# Patient Record
Sex: FEMALE | Race: WHITE | Employment: FULL TIME | URBAN - METROPOLITAN AREA
[De-identification: names, ages, dates, MRNs, and addresses within clinical notes are randomized per-mention and may not be internally consistent; named-entity substitution may affect disease eponyms.]

---

## 2017-12-04 ENCOUNTER — APPOINTMENT (OUTPATIENT)
Dept: GENERAL RADIOLOGY | Facility: HOSPITAL | Age: 42
End: 2017-12-04
Payer: MEDICAID

## 2017-12-04 ENCOUNTER — HOSPITAL ENCOUNTER (EMERGENCY)
Facility: HOSPITAL | Age: 42
Discharge: HOME | End: 2017-12-04
Payer: MEDICAID

## 2017-12-04 VITALS
SYSTOLIC BLOOD PRESSURE: 148 MMHG | HEART RATE: 80 BPM | RESPIRATION RATE: 16 BRPM | BODY MASS INDEX: 36.32 KG/M2 | OXYGEN SATURATION: 99 % | WEIGHT: 205 LBS | TEMPERATURE: 98 F | DIASTOLIC BLOOD PRESSURE: 71 MMHG | HEIGHT: 63 IN

## 2017-12-04 DIAGNOSIS — S80.01XA CONTUSION OF RIGHT KNEE, INITIAL ENCOUNTER: Primary | ICD-10-CM

## 2017-12-04 PROCEDURE — 73560 X-RAY EXAM OF KNEE 1 OR 2: CPT

## 2017-12-04 PROCEDURE — 99283 EMERGENCY DEPT VISIT LOW MDM: CPT

## 2017-12-04 RX ORDER — ACETAMINOPHEN AND CODEINE PHOSPHATE 300; 30 MG/1; MG/1
1 TABLET ORAL EVERY 4 HOURS PRN
Qty: 20 TABLET | Refills: 0 | Status: SHIPPED | OUTPATIENT
Start: 2017-12-04 | End: 2017-12-11

## 2017-12-05 NOTE — ED NOTES
Pt safe to d/c home per MD, able to dress self.  D/C teaching completed, pt verbalizes understanding, ambulatory with steady gait to exit      Knee immobilizer applied

## 2017-12-05 NOTE — ED INITIAL ASSESSMENT (HPI)
PT came in for right knee pain after fall onto knee. Pt had mechanical fall while walking. Denies hitting head or LOC. No blood thinners. RR even and nonlabored, speaking in full sentences.

## 2017-12-05 NOTE — ED PROVIDER NOTES
Patient Seen in: [facility] AND [facility] Emergency Department    History   Patient presents with:  Lower Extremity Injury (musculoskeletal)    Stated Complaint: fall onto right knee    HPI    Patient presents the emergency department after tripping and falling No data to display    ED Course as of Dec 04 2332  ------------------------------------------------------------       [facility]       Radiology findings: Xr Knee (1 Or 2 Views), Right (cpt=73560)    Result Date: 12/4/2017  CONCLUSION:   No acute fracture.

## 2018-12-13 ENCOUNTER — HOSPITAL ENCOUNTER (EMERGENCY)
Facility: HOSPITAL | Age: 43
Discharge: HOME | End: 2018-12-13
Attending: EMERGENCY MEDICINE
Payer: MEDICAID

## 2018-12-13 VITALS
BODY MASS INDEX: 34.91 KG/M2 | DIASTOLIC BLOOD PRESSURE: 82 MMHG | OXYGEN SATURATION: 97 % | HEART RATE: 90 BPM | TEMPERATURE: 98 F | HEIGHT: 63 IN | WEIGHT: 197 LBS | RESPIRATION RATE: 18 BRPM | SYSTOLIC BLOOD PRESSURE: 132 MMHG

## 2018-12-13 DIAGNOSIS — H10.13 ALLERGIC CONJUNCTIVITIS OF BOTH EYES: Primary | ICD-10-CM

## 2018-12-13 PROCEDURE — 99282 EMERGENCY DEPT VISIT SF MDM: CPT

## 2018-12-13 RX ORDER — KETOTIFEN FUMARATE 0.35 MG/ML
1 SOLUTION/ DROPS OPHTHALMIC 2 TIMES DAILY
Qty: 10 ML | Refills: 0 | Status: SHIPPED | OUTPATIENT
Start: 2018-12-13

## 2018-12-13 RX ORDER — HYDROCHLOROTHIAZIDE 12.5 MG/1
12.5 CAPSULE, GELATIN COATED ORAL DAILY
COMMUNITY

## 2018-12-13 RX ORDER — LOSARTAN POTASSIUM 50 MG/1
50 TABLET ORAL DAILY
COMMUNITY

## 2018-12-13 NOTE — ED PROVIDER NOTES
Patient Seen in: [facility] AND [facility] Emergency Department    History   Patient presents with:   Eye Visual Problem (opthalmic)    Stated Complaint: bilateral eye redness    HPI    64-year-old female with past medical history significant for high blood press m/r/g, normal distal pulses  Pulmonary/Chest: CTA b/l with no rales, wheezes. No chest wall tenderness  Abdominal: Nontender. Nondistended. Soft. Bowel sounds are normal.   Back:   : Musculoskeletal: Normal range of motion. No deformity.    Arthur Fina

## 2024-12-13 ENCOUNTER — HOSPITAL ENCOUNTER (EMERGENCY)
Facility: HOSPITAL | Age: 49
Discharge: HOME | End: 2024-12-13
Attending: EMERGENCY MEDICINE
Payer: OTHER MISCELLANEOUS

## 2024-12-13 ENCOUNTER — APPOINTMENT (OUTPATIENT)
Dept: GENERAL RADIOLOGY | Facility: HOSPITAL | Age: 49
End: 2024-12-13
Attending: EMERGENCY MEDICINE
Payer: OTHER MISCELLANEOUS

## 2024-12-13 VITALS
DIASTOLIC BLOOD PRESSURE: 60 MMHG | SYSTOLIC BLOOD PRESSURE: 127 MMHG | OXYGEN SATURATION: 97 % | RESPIRATION RATE: 18 BRPM | HEART RATE: 71 BPM | TEMPERATURE: 98 F

## 2024-12-13 DIAGNOSIS — S90.121A CONTUSION OF LESSER TOE OF RIGHT FOOT WITHOUT DAMAGE TO NAIL, INITIAL ENCOUNTER: Primary | ICD-10-CM

## 2024-12-13 PROCEDURE — 73630 X-RAY EXAM OF FOOT: CPT | Performed by: EMERGENCY MEDICINE

## 2024-12-13 PROCEDURE — 99283 EMERGENCY DEPT VISIT LOW MDM: CPT

## 2024-12-14 NOTE — ED PROVIDER NOTES
Patient Seen in: [facility] Emergency Department      History   No chief complaint on file.    Stated Complaint: Foot Injury    Subjective:     49-year-old female states she dropped a heavy toolbox on her right foot 2 to 3 hours ago and she has mostly pain in the pinky toe when she ambulates.  No wounds or bleeding.  She was wearing shoes while this occurred.  She said just sitting there does not really hurt.  No ankle pain.  No other complaints. This did happen at [employer] while she was working.              Objective:     No pertinent past medical history.            No pertinent past surgical history.              No pertinent social history.                Physical Exam     ED Triage Vitals [12/13/24 8755]   /80   Pulse 65   Resp 20   Temp 97.6 °F (36.4 °C)   Temp src    SpO2 99 %   O2 Device None (Room air)       Current Vitals:   Vital Signs  BP: 122/80  Pulse: 65  Resp: 20  Temp: 97.6 °F (36.4 °C)    Oxygen Therapy  SpO2: 99 %  O2 Device: None (Room air)        Physical Exam  HENT:      Head: Normocephalic.      Right Ear: External ear normal.      Left Ear: External ear normal.      Nose: Nose normal.      Mouth/Throat:      Mouth: Mucous membranes are moist.   Eyes:      Extraocular Movements: Extraocular movements intact.   Neck:      Comments: Moves head around easily  Pulmonary:      Effort: Pulmonary effort is normal.   Musculoskeletal:      Comments: Right foot carefully examined: The skin is intact.  Tenderness only to the pinky toe.  No tenderness throughout the foot.  Ankle is nontender on the right as well.  Strong pedal pulse.  Normal distal sensation station.  Right knee unremarkable   Skin:     General: Skin is warm.   Neurological:      Mental Status: She is alert.      Sensory: No sensory deficit.      Motor: No weakness.             ED Course   Labs Reviewed - No data to display    ED Course as of 12/13/24  2104  ------------------------------------------------------------  Time: 12/13 2020  Comment: I do spoke to radiology.  They are coming to do the film.              [facility]      XR FOOT, COMPLETE (MIN 3 VIEWS), RIGHT (CPT=73630)    Result Date: 12/13/2024  CONCLUSION:  No radiographically visible acute osseous injury of the right foot.    Dictated by (CST): Christiano Craft MD on 12/13/2024 at 9:01 PM     Finalized by (CST): Christiano Craft MD on 12/13/2024 at 9:02 PM                 Medical Decision Making  Patient with work-related injury where she dropped something on her right pinky toe.  Differential could include but not limited to fractures, dislocations, contusions, lacerations and many other possibilities.  No wound involvement on exam.  Will get x-rays.  Will refer to [facility].    Amount and/or Complexity of Data Reviewed  Radiology: ordered and independent interpretation performed.     Details: X-ray of the foot shows no acute process.    Risk  OTC drugs.        Disposition and Plan     Clinical Impression:  1. Contusion of lesser toe of right foot without damage to nail, initial encounter         Disposition:  Discharge  12/13/2024  9:04 pm    Follow-up:  [facility]  [address] [address]  [phone]  Call in 3 days            Medications Prescribed:  Current Discharge Medication List              Supplementary Documentation:

## 2024-12-14 NOTE — DISCHARGE INSTRUCTIONS
Elevate is much as possible.  Wear the open toed shoe for comfort.  Follow-up with occupational health.  Light duty work if needed.  Return for changes or worsening and read all instructions.

## 2024-12-18 ENCOUNTER — APPOINTMENT (OUTPATIENT)
Dept: OTHER | Facility: HOSPITAL | Age: 49
End: 2024-12-18
Attending: EMERGENCY MEDICINE

## 2024-12-27 ENCOUNTER — HOSPITAL ENCOUNTER (OUTPATIENT)
Dept: GENERAL RADIOLOGY | Facility: HOSPITAL | Age: 49
Discharge: HOME | End: 2024-12-27
Attending: EMERGENCY MEDICINE

## 2024-12-27 ENCOUNTER — OFFICE VISIT (OUTPATIENT)
Dept: OTHER | Facility: HOSPITAL | Age: 49
End: 2024-12-27
Attending: EMERGENCY MEDICINE

## 2024-12-27 DIAGNOSIS — R52 PAIN: Primary | ICD-10-CM

## 2024-12-27 DIAGNOSIS — R52 PAIN: ICD-10-CM

## 2024-12-27 PROCEDURE — 73660 X-RAY EXAM OF TOE(S): CPT | Performed by: EMERGENCY MEDICINE

## 2025-01-10 ENCOUNTER — APPOINTMENT (OUTPATIENT)
Dept: OTHER | Facility: HOSPITAL | Age: 50
End: 2025-01-10
Attending: EMERGENCY MEDICINE

## (undated) NOTE — ED AVS SNAPSHOT
Ash Kelly   MRN: [id number]    Department:  [facility] Emergency Department   Date of Visit:  12/13/2018           Disclosure     Insurance plans vary and the physician(s) referred by the ER may not be covered by your plan.  Please contact CARE PHYSICIAN AT ONCE OR RETURN IMMEDIATELY TO THE EMERGENCY DEPARTMENT. If you have been prescribed any medication(s), please fill your prescription right away and begin taking the medication(s) as directed.   If you believe that any of the medications

## (undated) NOTE — ED AVS SNAPSHOT
Gina Barger   MRN: [id number]    Department:  [facility] Emergency Department   Date of Visit:  12/4/2017           Disclosure     Insurance plans vary and the physician(s) referred by the ER may not be covered by your plan.  Please contact CARE PHYSICIAN AT ONCE OR RETURN IMMEDIATELY TO THE EMERGENCY DEPARTMENT. If you have been prescribed any medication(s), please fill your prescription right away and begin taking the medication(s) as directed.   If you believe that any of the medications

## (undated) NOTE — LETTER
December 4, 2017    Patient: Raz Bobby   Date of Visit: 12/4/2017       To Whom It May Concern:    Pérez Felix was seen and treated in our emergency department on 12/4/2017. She should not return to work until 12/6/17.     If you have any questi

## (undated) NOTE — LETTER
Date & Time: 12/13/2024, 9:06 PM  Patient: Ruth Cavazos  Encounter Provider(s):    Syd Gustafson MD       To Whom It May Concern:    Ruth Cavazos was seen and treated in our department on 12/13/2024. She should not return to work until 12/17/24 .    If you have any questions or concerns, please do not hesitate to call.        _____________________________  Physician/APC Signature